# Patient Record
Sex: FEMALE | Race: BLACK OR AFRICAN AMERICAN | Employment: FULL TIME | URBAN - METROPOLITAN AREA
[De-identification: names, ages, dates, MRNs, and addresses within clinical notes are randomized per-mention and may not be internally consistent; named-entity substitution may affect disease eponyms.]

---

## 2021-11-05 ENCOUNTER — HOSPITAL ENCOUNTER (EMERGENCY)
Age: 36
Discharge: HOME | End: 2021-11-05
Attending: EMERGENCY MEDICINE
Payer: COMMERCIAL

## 2021-11-05 VITALS
OXYGEN SATURATION: 98 % | TEMPERATURE: 99.9 F | DIASTOLIC BLOOD PRESSURE: 97 MMHG | RESPIRATION RATE: 20 BRPM | HEART RATE: 105 BPM | SYSTOLIC BLOOD PRESSURE: 150 MMHG

## 2021-11-05 DIAGNOSIS — J02.0 STREP PHARYNGITIS: Primary | ICD-10-CM

## 2021-11-05 LAB
S PYO AG THROAT QL: POSITIVE
SARS-COV-2, PCR: NOT DETECTED

## 2021-11-05 PROCEDURE — 87880 STREP A ASSAY W/OPTIC: CPT

## 2021-11-05 PROCEDURE — 99284 EMERGENCY DEPT VISIT MOD MDM: CPT

## 2021-11-05 PROCEDURE — 6370000000 HC RX 637 (ALT 250 FOR IP): Performed by: EMERGENCY MEDICINE

## 2021-11-05 PROCEDURE — U0003 INFECTIOUS AGENT DETECTION BY NUCLEIC ACID (DNA OR RNA); SEVERE ACUTE RESPIRATORY SYNDROME CORONAVIRUS 2 (SARS-COV-2) (CORONAVIRUS DISEASE [COVID-19]), AMPLIFIED PROBE TECHNIQUE, MAKING USE OF HIGH THROUGHPUT TECHNOLOGIES AS DESCRIBED BY CMS-2020-01-R: HCPCS

## 2021-11-05 PROCEDURE — U0005 INFEC AGEN DETEC AMPLI PROBE: HCPCS

## 2021-11-05 RX ORDER — AMOXICILLIN 500 MG/1
500 CAPSULE ORAL 3 TIMES DAILY
Qty: 30 CAPSULE | Refills: 0 | Status: SHIPPED | OUTPATIENT
Start: 2021-11-05 | End: 2021-11-15

## 2021-11-05 RX ORDER — AMLODIPINE BESYLATE 5 MG/1
5 TABLET ORAL DAILY
COMMUNITY
Start: 2021-02-11

## 2021-11-05 RX ORDER — HYDROCHLOROTHIAZIDE 25 MG/1
25 TABLET ORAL DAILY
COMMUNITY
Start: 2021-02-11

## 2021-11-05 RX ORDER — AMOXICILLIN 250 MG/1
500 CAPSULE ORAL ONCE
Status: COMPLETED | OUTPATIENT
Start: 2021-11-05 | End: 2021-11-05

## 2021-11-05 ASSESSMENT — PAIN SCALES - GENERAL: PAINLEVEL_OUTOF10: 8

## 2021-11-05 ASSESSMENT — PAIN DESCRIPTION - LOCATION: LOCATION: THROAT;EAR

## 2021-11-05 ASSESSMENT — ENCOUNTER SYMPTOMS
EYES NEGATIVE: 1
RESPIRATORY NEGATIVE: 1
GASTROINTESTINAL NEGATIVE: 1

## 2021-11-05 ASSESSMENT — PAIN DESCRIPTION - PAIN TYPE: TYPE: ACUTE PAIN

## 2021-11-05 NOTE — LETTER
[facility] Emergency Department  [facility] [address] [id number]  Phone: [phone]  Fax: [phone]               November 5, 2021    Patient: Jacinto Jordan   YOB: 1985   Date of Visit: 11/5/2021       To Whom It May Concern:    Jacinto Jordan was seen and treated in our emergency department on 11/5/2021. She may return to work on 11/6/21.       Sincerely,       Alejandrina Mullins MD         Signature:__________________________________

## 2021-11-05 NOTE — ED PROVIDER NOTES
99.9 °F (37.7 °C), Pulse: 105, Resp: 20, SpO2: 99 %   Physical Exam  Vitals and nursing note reviewed. Constitutional:       General: She is not in acute distress. HENT:      Head: Normocephalic and atraumatic. Right Ear: Ear canal normal. Tympanic membrane is bulging. Tympanic membrane is not erythematous. Left Ear: Ear canal normal. Tympanic membrane is bulging. Tympanic membrane is not erythematous. Ears:      Comments: Mild bulging the bilateral tympanic membranes with no erythema or loss of cone of light. There is no effusion noted bilaterally. Oropharynx is erythematous with no exudates noted. There is no cervical lymphadenopathy noted. Mouth/Throat:      Mouth: Mucous membranes are moist.      Pharynx: Posterior oropharyngeal erythema present. Eyes:      General: No scleral icterus. Extraocular Movements: Extraocular movements intact. Conjunctiva/sclera: Conjunctivae normal.      Pupils: Pupils are equal, round, and reactive to light. Lymphadenopathy:      Cervical:      Right cervical: No superficial or deep cervical adenopathy. Left cervical: No superficial or deep cervical adenopathy. Neurological:      Mental Status: She is alert. Diagnostic Results     LABS:   Results for orders placed or performed during the hospital encounter of 11/05/21   Strep Screen Group A Throat    Specimen: Throat   Result Value Ref Range    Rapid Strep A Screen POSITIVE (A) Negative       RECENT VITALS:  BP: (!) 150/97,Temp: 99.9 °F (37.7 °C), Pulse: 105, Resp: 20, SpO2: 98 %     Procedures     N/A    ED Course     Nursing Notes, Past Medical Hx, Past Surgical Hx, Social Hx,Allergies, and Family Hx were reviewed.     patient was given the following medications:  Orders Placed This Encounter   Medications    amoxicillin (AMOXIL) capsule 500 mg    amoxicillin (AMOXIL) 500 MG capsule     Sig: Take 1 capsule by mouth 3 times daily for 10 days     Dispense:  30 capsule     Refill:  0 CONSULTS:  None    MEDICAL DECISIONMAKING / ASSESSMENT / PLAN     Diandra Cisneros is a 43 y.o. female presents complaining of body aches, sore throat, and ear pain. Patient states symptoms started yesterday. On arrival, patient has borderline temperature of 99.9. She does have mild oropharyngeal erythema but no exudates noted and no cervical lymphadenopathy. Tympanic membranes are bulging but no erythema or effusion noted. Rapid strep was sent and is positive so patient was given amoxicillin in the emergency department and will be discharged with prescription for the same. She also had testing sent for Covid which is pending and she will be instructed to self quarantine until this results. She will be instructed to follow-up with her primary care provider for repeat evaluation if necessary. Clinical Impression     1.  Strep pharyngitis        Disposition     PATIENT REFERRED TO:  Lopez Carson            DISCHARGE MEDICATIONS:  New Prescriptions    AMOXICILLIN (AMOXIL) 500 MG CAPSULE    Take 1 capsule by mouth 3 times daily for 10 days       [address] [address] Marcos Carson MD  11/05/21 [id number]